# Patient Record
Sex: FEMALE | Employment: FULL TIME | URBAN - METROPOLITAN AREA
[De-identification: names, ages, dates, MRNs, and addresses within clinical notes are randomized per-mention and may not be internally consistent; named-entity substitution may affect disease eponyms.]

---

## 2020-08-03 NOTE — TELEPHONE ENCOUNTER
RECORDS RECEIVED FROM: N/A   DATE RECEIVED: 8/7/2020   NOTES STATUS DETAILS   OFFICE NOTE from referring provider N/A    OFFICE NOTE from other specialist Care Everywhere 7/31/2020 Office visit with Cheryl Courtney PA-C ([facility], )       DISCHARGE SUMMARY from hospital N/A    OPERATIVE REPORT N/A    MEDICATION LIST Care Everywhere         ENDOSCOPY  N/A    COLONOSCOPY N/A    ERCP N/A    EUS N/A    STOOL TESTING N/A    PERTINENT LABS Care Everywhere    PATHOLOGY REPORTS (RELATED) N/A    IMAGING (CT, MRI, EGD) N/A      REFERRAL INFORMATION    Date referral was placed: 8/7/2020   Date all records received: N/A   Date records were scanned into Epic: N/A   Date records were sent to Provider to review: N/A   Date and recommendation received from provider:  LETTER SENT  SCHEDULE APPOINTMENT   Date patient was contacted to schedule: 8/3/2020

## 2020-08-07 ENCOUNTER — PRE VISIT (OUTPATIENT)
Dept: GASTROENTEROLOGY | Facility: CLINIC | Age: 25
End: 2020-08-07

## 2020-08-07 ENCOUNTER — VIRTUAL VISIT (OUTPATIENT)
Dept: GASTROENTEROLOGY | Facility: CLINIC | Age: 25
End: 2020-08-07
Payer: COMMERCIAL

## 2020-08-07 VITALS — BODY MASS INDEX: 22.81 KG/M2 | HEIGHT: 69 IN | WEIGHT: 154 LBS

## 2020-08-07 DIAGNOSIS — R14.3 FLATULENCE, ERUCTATION AND GAS PAIN: ICD-10-CM

## 2020-08-07 DIAGNOSIS — R12 HEARTBURN: Primary | ICD-10-CM

## 2020-08-07 DIAGNOSIS — R09.82 POST-NASAL DRIP: ICD-10-CM

## 2020-08-07 DIAGNOSIS — R07.89 CHEST PRESSURE: ICD-10-CM

## 2020-08-07 DIAGNOSIS — R63.5 WEIGHT GAIN: ICD-10-CM

## 2020-08-07 DIAGNOSIS — R14.2 FLATULENCE, ERUCTATION AND GAS PAIN: ICD-10-CM

## 2020-08-07 DIAGNOSIS — R14.1 FLATULENCE, ERUCTATION AND GAS PAIN: ICD-10-CM

## 2020-08-07 RX ORDER — METHYLPHENIDATE HYDROCHLORIDE 10 MG/1
10 TABLET ORAL
COMMUNITY
Start: 2019-09-23

## 2020-08-07 RX ORDER — BUPROPION HYDROCHLORIDE 150 MG/1
300 TABLET ORAL
COMMUNITY
Start: 2020-07-07

## 2020-08-07 SDOH — HEALTH STABILITY: MENTAL HEALTH: HOW OFTEN DO YOU HAVE 6 OR MORE DRINKS ON ONE OCCASION?: WEEKLY

## 2020-08-07 SDOH — HEALTH STABILITY: MENTAL HEALTH: HOW MANY STANDARD DRINKS CONTAINING ALCOHOL DO YOU HAVE ON A TYPICAL DAY?: 3 OR 4

## 2020-08-07 SDOH — HEALTH STABILITY: MENTAL HEALTH: HOW OFTEN DO YOU HAVE A DRINK CONTAINING ALCOHOL?: 2-4 TIMES A MONTH

## 2020-08-07 ASSESSMENT — MIFFLIN-ST. JEOR: SCORE: 1507.92

## 2020-08-07 ASSESSMENT — PAIN SCALES - GENERAL: PAINLEVEL: NO PAIN (0)

## 2020-08-07 NOTE — PROGRESS NOTES
"Raquel Ivan is a 24 year old female who is being evaluated via a billable telephone visit.      The patient has been notified of following:     \"This telephone visit will be conducted via a call between you and your physician/provider. We have found that certain health care needs can be provided without the need for a physical exam.  This service lets us provide the care you need with a short phone conversation.  If a prescription is necessary we can send it directly to your pharmacy.  If lab work is needed we can place an order for that and you can then stop by our lab to have the test done at a later time.    Telephone visits are billed at different rates depending on your insurance coverage. During this emergency period, for some insurers they may be billed the same as an in-person visit.  Please reach out to your insurance provider with any questions.    If during the course of the call the physician/provider feels a telephone visit is not appropriate, you will not be charged for this service.\"    Patient has given verbal consent for Telephone visit?  Yes    What phone number would you like to be contacted at? [phone]    How would you like to obtain your AVS? MyChart    During this virtual visit the patient is located in [place], patient verifies this as the location during the entirety of this visit.     Phone call duration: 35 minutes    Kareem Willson, DO      "

## 2020-08-07 NOTE — LETTER
"    8/7/2020         RE: Raquel Ivan  [address] [address]        Dear Colleague,    Thank you for referring your patient, Raquel Ivan, to the [facility]. Please see a copy of my visit note below.    Raquel Ivan is a 24 year old female who is being evaluated via a billable telephone visit.      The patient has been notified of following:     \"This telephone visit will be conducted via a call between you and your physician/provider. We have found that certain health care needs can be provided without the need for a physical exam.  This service lets us provide the care you need with a short phone conversation.  If a prescription is necessary we can send it directly to your pharmacy.  If lab work is needed we can place an order for that and you can then stop by our lab to have the test done at a later time.    Telephone visits are billed at different rates depending on your insurance coverage. During this emergency period, for some insurers they may be billed the same as an in-person visit.  Please reach out to your insurance provider with any questions.    If during the course of the call the physician/provider feels a telephone visit is not appropriate, you will not be charged for this service.\"    Patient has given verbal consent for Telephone visit?  Yes    What phone number would you like to be contacted at? [phone]    How would you like to obtain your AVS? MyChart    During this virtual visit the patient is located in [place], patient verifies this as the location during the entirety of this visit.     Phone call duration: 35 minutes    Kareem Willson DO        Gastroenterology Visit for: Raquel Ivan 1995   MRN: [id number]     Reason for Visit:  chief complaint    Referred by: self referral / No address on file  Patient Care Team:  Kareem Willson DO as MD (Gastroenterology)   [facility]     History of Present Illness: "   Raquel Ivan is 24 year old female  for [employer] with ADHD, anxiety/depression, GERD who is presenting as a new patient in consultation as a self referral with a chief complaint of heartburn and chest pressure.  ---------------------------------------------------------------  Raquel Ivan states approximately two months ago the patient began feeling infrequent post-prandial heartburn and pressure. She feels that it has gotten worse over time. Over the past 2-3 weeks, thirty minutes post meal she has a pressure sensation. The symptoms worsen as the day progresses to a burning sensation. The patient traveled to [place] a month and a half ago but her symptoms began before then. There may have been some increased work stress but does not believe this to be severe or a cause of her symptoms.     The patient feels that she may have gained some weight during the pandemic and estimates her weight to be 140-150 pounds and has a height of 5'9''.     She has changed her diet over the past week and a half which she feels is helping. She is avoiding soda and fried foods. She has increased her fruit and vegetable intake. She has elevated the head of the bed which she also feels has improved her symptoms. Omeprazole 30-60 minutes before breakfast and dinner BID appears to be helping as much as once daily dosing however she has been on this dose for only 6 days. When her symptoms worsen in the evening she takes 3-4 TUMS with improvement. Prior to initiation of PPI therapy her nocturnal symptoms were bad to the point that she could not sleep. Leaning forward and lying in bed causes increased burning and pressure sensation.     She has been having worsening seasonal allergies and post nasal drip over the same duration of time. She has been coughing up mucus as well. The patient reports a history of palpitations that has been evaluated by her PCP and/or cardiologist.     The patient has had some increase in  bloating and belching- taking gas x and multi-purpose heartburn gas relief. Patient is moving bowels daily to every other day.   ---------------------------------------------------------------     Raquel Ivan denies dysphagia, odynophagia, nausea, vomiting, early satiety, abdominal pain, abdominal distension/bloating, diarrhea, constipation, hematochezia, or melena. No unintentional weight loss.     Family history of colon cancer: none  Wt Readings from Last 5 Encounters:   08/07/20 69.9 kg (154 lb)        Today's Elizabeth Swallow Questionnaire (SSQ):    Brief Esophageal Dysphagia Questionnaire (BEDQ):  We would like to ask about your dysphagia symptoms over the past 30 days, 6 months, and 12 months.  Please think about your symptom experiences and choose the best answer.      Over the past 14 days, on average, how often have you had the following?  If your response falls between two categories, please make your best guess.  If you are unable to eat the type of food in the question, please check  Cannot eat this.    Cannot  Eat This 0  Rarely/ Never 1  Once or twice a month 2  1-2 times per week 3  3-5 times per week 4  Daily or almost daily 5  Several times per day   Trouble eating solid food (meat, bread, vegetables)     x        Trouble eating soft foods (yogurt, jello, pudding)     x        Trouble swallowing liquids       x        Pain while swallowing  --- x        Coughing or choking while swallowing foods or liquids --- x          SCORE: 0    Over the past 14 days, on average, how would you rate your discomfort or pain during swallowing? If you are unable to eat the type of food in the question, please check  Cannot eat this.    Cannot Eat This 0  None 1  Very Mild 2  Mild 3  Moderate 4  Moderately Severe    5  Severe   Eating solid food   (meat, bread, vegetables)  x        Eating soft foods   (yogurt, jello, pudding)  x        Drinking liquids  x          SCORE: 0    Approximately how many times in the  past 12 months have you:   Had food stuck in your throat or esophagus for a period lasting longer than 30 minutes?  Had to visit the emergency room because of food stuck in your throat or esophagus?    Eckardt Score:   Score Dysphagia Regurgitation Retrosternal Pain Weight Loss (kg)   0 None None None None   1 Occasional Occasional Occasional <5   2 Daily Daily Daily 5-10   3 Each meal Each meal Each meal >10     SCORE: 0    Any hardcopy questionnaire completed by the patient regarding SSQ, BEDQ, or Eckardt Score have been scanned into the media section of Epic.     STUDIES & PROCEDURES:    EGD:   Date:  Impression:  Pathology Report:    Colonoscopy:  Date:  Impression:  Pathology Report:     EndoFLIP directed at the UES or LES (8cm (EF-325) balloon length or 16cm (EF-322) balloon length):   Date:  8cm balloon  Balloon inflation Balloon pressure CSA (mm^2) DI (mm^2/mmHg) Dmin (mm) Compliance   20 (ladmark ID)        30        40        50           16cm balloon  Balloon inflation Balloon pressure CSA (mm^2) DI (mm^2/mmHg) Dmin (mm) Compliance   30 (ladmark ID)        40        50        60        70           High Resolution Manometry:  Date:  Impression:    PH/Impedance:  Date:  Impression:     Bravo:  48 or 96hr  Date:  Impression:    CT:  Date:  Impression:     Esophagram:  Date:  Impression:     Prior medical records were reviewed including, but not limited to, notes from referring providers, lab work, radiographic tests, and other diagnostic tests. Pertinent results were summarized above.     History     Past Medical History:   Diagnosis Date     ADHD        History reviewed. No pertinent surgical history.    Social History     Socioeconomic History     Marital status: Not on file     Spouse name: Not on file     Number of children: Not on file     Years of education: Not on file     Highest education level: Not on file   Occupational History     Not on file   Social Needs     Financial resource strain: Not on  file     Food insecurity     Worry: Not on file     Inability: Not on file     Transportation needs     Medical: Not on file     Non-medical: Not on file   Tobacco Use     Smoking status: Never Smoker     Smokeless tobacco: Never Used   Substance and Sexual Activity     Alcohol use: Yes     Alcohol/week: 1.0 - 3.0 standard drinks     Types: 1 - 3 Shots of liquor per week     Frequency: 2-4 times a month     Drinks per session: 3 or 4     Binge frequency: Weekly     Drug use: Not Currently     Sexual activity: Not on file   Lifestyle     Physical activity     Days per week: Not on file     Minutes per session: Not on file     Stress: Not on file   Relationships     Social connections     Talks on phone: Not on file     Gets together: Not on file     Attends Rastafarian service: Not on file     Active member of club or organization: Not on file     Attends meetings of clubs or organizations: Not on file     Relationship status: Not on file     Intimate partner violence     Fear of current or ex partner: Not on file     Emotionally abused: Not on file     Physically abused: Not on file     Forced sexual activity: Not on file   Other Topics Concern     Parent/sibling w/ CABG, MI or angioplasty before 65F 55M? Not Asked   Social History Narrative     Not on file       History reviewed. No pertinent family history.    Medications and Allergies:     Outpatient Encounter Medications as of 8/7/2020   Medication Sig Dispense Refill     buPROPion (WELLBUTRIN XL) 150 MG 24 hr tablet Take 300 mg by mouth       omeprazole (PRILOSEC) 20 MG DR capsule Take 20 mg by mouth       methylphenidate (RITALIN) 10 MG tablet Take 10 mg by mouth       No facility-administered encounter medications on file as of 8/7/2020.         No Known Allergies     Review of systems:  A full 10 point review of systems was obtained and was negative except for the pertinent positives and negatives stated within the HPI.    Objective Findings:   Physical Exam:   "  Constitutional: Ht 1.753 m (5' 9\")   Wt 69.9 kg (154 lb)   BMI 22.74 kg/m    Telephone telemedicine visit.        Labs, Radiology, Pathology    No results found for: WBC, HGB, HCT, PLT, CHOL, TRIG, HDL, ALT, AST, NA, CREATININE, BUN, CO2, TSH, PSA, INR, GLUF     Patient Active Problem List    Diagnosis Date Noted     Heartburn 08/07/2020     Priority: Medium     Chest pressure 08/07/2020     Priority: Medium     Weight gain 08/07/2020     Priority: Medium     Flatulence, eructation and gas pain 08/07/2020     Priority: Medium      Assessment and Plan   Assessment:    Raquel Ivan is 24 year old female  for [employer] with ADHD, anxiety/depression, GERD who is presenting as a new patient in consultation as a self referral with a chief complaint of heartburn and chest pressure.    The patient was seen today in clinic via telephone telemedicine consultation.  She is presenting with classic symptoms of heartburn and atypical symptoms of chest pressure.  Her symptoms are relatively new over the past several months however they coincide with a possible weight gain in the setting of the COVID-19 pandemic.  She currently has partial relief of symptoms with omeprazole 20 mg twice daily.  She is taking the medication appropriately 30 to 60 minutes before breakfast and dinner.  She has modified some of her diet to avoid sodas and fried foods.    I explained to the patient the possible pathophysiologic mechanisms by which she could have reflux disease including her possible weight gain in the setting of transient lower esophageal sphincter relaxations (TLESR).  If in fact she has gained some weight, weight loss of 5 pounds has been shown to reduce reflux and may allow her to come off of medication in the future.  I also discussed that global illumination of specific foods is not recommended by guidelines at this time.  However, if she identifies specific foods and drinks that worsen her symptoms she should " avoid them.  I counseled her on head of bed elevation and avoiding meals within 4 hours of lying down to sleep.  Sodas may increase her reflux due to increased gas distention in the stomach and fried foods can slow down gastric emptying time thereby increasing the potential for reflux.    Notably, the patient also reports some increase in postnasal drip which could be a function of allergies or could be an extra esophageal manifestation of gastroesophageal reflux disease.  We will monitor this symptom as her reflux is managed.    For the time being, she was instructed to maintain her dose of omeprazole 20 mg twice daily 30 to 60 minutes before breakfast and dinner.  She was also counseled to monitor her dietary intake and weight.  She was offered consultation with the dietitian to help her with dietary modification if needed.  At this point in time, given her age and lack of alarm symptoms she does not need an upper endoscopy.  If her symptoms worsen or persist despite conservative management and upper endoscopy will be offered.  Also, at this time she does not need objective reflux testing with either a Bravo or 24-hour pH impedance catheter.    At her one-month follow-up we will discuss her symptoms and either increase her dose of omeprazole to 40 mg twice daily, maintain the current dose, or potentially add a medication such as Gaviscon with alginate.    1. Heartburn    2. Chest pressure    3. Weight gain    4. Flatulence, eructation and gas pain       No orders of the defined types were placed in this encounter.     Plan:  1. Please continue taking the omeprazole 20mg 30-60 minutes before breakfast and dinner.   2. Please continue to modify diet to avoid foods that you identify as worsening your symptoms. Also, try to have at least 4 hours between dinner and lying down to sleep. Continue head of bed elevation.   3. Please monitor your weight as the possible slight weight gain as a result of the pandemic may have  caused an increase in abdominal pressure and resulted in your symptoms of heartburn. A possible resource within the GI division is a dietitian who may be able to help discuss foods that can worsen reflux.   4. Follow up in one month to discuss symptoms and determine if an increase in dose is needed or if an addition of a product such as gaviscon with alginate is warranted.     Follow up plan:   Return to clinic 1 month and as needed.    The risks and benefits of my recommendations, as well as other treatment options were discussed with the patient and any available family today. All questions were answered.     o Follow up: As planned above. Today, I personally spent 35 minutes in directaudio time with the patient, of which greater than 50% of the time was spent in patient education and counseling as described above. 15 minutes were spent on indirect care associated with the patient's consultation.    The patient verbalized understanding of the plan and was appreciative for the time spent and information provided during the office visit.     Author:   Kareem Willson DO   of Medicine  Division of Gastroenterology, Hepatology, and Nutrition  [facility]     Documentation assisted by voice recognition and documentation system.      Again, thank you for allowing me to participate in the care of your patient.        Sincerely,        Kareem Willson DO

## 2020-08-07 NOTE — PATIENT INSTRUCTIONS
1. Please continue taking the omeprazole 20mg 30-60 minutes before breakfast and dinner.   2. Please continue to modify diet to avoid foods that you identify as worsening your symptoms. Also, try to have at least 4 hours between dinner and lying down to sleep. Continue head of bed elevation.   3. Please monitor your weight as the possible slight weight gain as a result of the pandemic may have caused an increase in abdominal pressure and resulted in your symptoms of heartburn. A possible resource within the GI division is a dietitian who may be able to help discuss foods that can worsen reflux.   4. Follow up in one month to discuss symptoms and determine if an increase in dose is needed or if an addition of a product such as gaviscon with alginate is warranted.

## 2020-08-07 NOTE — NURSING NOTE
"Chief Complaint   Patient presents with     Consult     GERD and heartburn       Vitals:    08/07/20 0832   Weight: 69.9 kg (154 lb)   Height: 1.753 m (5' 9\")       Body mass index is 22.74 kg/m .      Isabell Hennessy LPN                          "

## 2020-08-07 NOTE — LETTER
Date:August 10, 2020      Patient was self referred, no letter generated. Do not send.        [facility] Information

## 2020-08-10 ENCOUNTER — PATIENT OUTREACH (OUTPATIENT)
Dept: GASTROENTEROLOGY | Facility: CLINIC | Age: 25
End: 2020-08-10

## 2020-08-10 NOTE — PROGRESS NOTES
Reached out to pt, left vm with clinic number to schedule dietician visit and/or follow up visit.  Writer's number also relayed to further review plan below.    Plan:  1. Please continue taking the omeprazole 20mg 30-60 minutes before breakfast and dinner.   2. Please continue to modify diet to avoid foods that you identify as worsening your symptoms. Also, try to have at least 4 hours between dinner and lying down to sleep. Continue head of bed elevation.   3. Please monitor your weight as the possible slight weight gain as a result of the pandemic may have caused an increase in abdominal pressure and resulted in your symptoms of heartburn. A possible resource within the GI division is a dietitian who may be able to help discuss foods that can worsen reflux.   4. Follow up in one month to discuss symptoms and determine if an increase in dose is needed or if an addition of a product such as gaviscon with alginate is warranted.

## 2021-01-04 ENCOUNTER — HEALTH MAINTENANCE LETTER (OUTPATIENT)
Age: 26
End: 2021-01-04

## 2021-10-11 ENCOUNTER — HEALTH MAINTENANCE LETTER (OUTPATIENT)
Age: 26
End: 2021-10-11

## 2022-01-30 ENCOUNTER — HEALTH MAINTENANCE LETTER (OUTPATIENT)
Age: 27
End: 2022-01-30

## 2022-09-24 ENCOUNTER — HEALTH MAINTENANCE LETTER (OUTPATIENT)
Age: 27
End: 2022-09-24

## 2023-05-08 ENCOUNTER — HEALTH MAINTENANCE LETTER (OUTPATIENT)
Age: 28
End: 2023-05-08